# Patient Record
Sex: FEMALE | Race: WHITE | NOT HISPANIC OR LATINO | Employment: OTHER | URBAN - METROPOLITAN AREA
[De-identification: names, ages, dates, MRNs, and addresses within clinical notes are randomized per-mention and may not be internally consistent; named-entity substitution may affect disease eponyms.]

---

## 2017-05-31 ENCOUNTER — TRANSCRIBE ORDERS (OUTPATIENT)
Dept: ADMINISTRATIVE | Facility: HOSPITAL | Age: 62
End: 2017-05-31

## 2017-05-31 DIAGNOSIS — Z12.31 VISIT FOR SCREENING MAMMOGRAM: Primary | ICD-10-CM

## 2017-06-09 ENCOUNTER — HOSPITAL ENCOUNTER (OUTPATIENT)
Dept: MAMMOGRAPHY | Facility: HOSPITAL | Age: 62
Discharge: HOME | End: 2017-06-09
Admitting: FAMILY MEDICINE

## 2017-06-09 DIAGNOSIS — Z12.31 VISIT FOR SCREENING MAMMOGRAM: ICD-10-CM

## 2017-06-09 PROCEDURE — 77063 BREAST TOMOSYNTHESIS BI: CPT

## 2017-06-09 PROCEDURE — 77063 BREAST TOMOSYNTHESIS BI: CPT | Performed by: RADIOLOGY

## 2017-06-09 PROCEDURE — 77067 SCR MAMMO BI INCL CAD: CPT | Performed by: RADIOLOGY

## 2017-06-09 PROCEDURE — G0202 SCR MAMMO BI INCL CAD: HCPCS

## 2017-06-14 ENCOUNTER — TRANSCRIBE ORDERS (OUTPATIENT)
Dept: ENDOCRINOLOGY | Facility: CLINIC | Age: 62
End: 2017-06-14

## 2017-06-14 DIAGNOSIS — E04.1 THYROID NODULE: Primary | ICD-10-CM

## 2018-01-08 ENCOUNTER — TELEPHONE (OUTPATIENT)
Dept: INTERNAL MEDICINE | Facility: CLINIC | Age: 63
End: 2018-01-08

## 2018-01-08 NOTE — TELEPHONE ENCOUNTER
PT HAD TO CANCEL TODAY DUE TO  ICE ON HER ROADS  SHE NEEDS TO RESCHEDULE  BUT SHE DIDN'T WANT TO WAIT ANOTHER 6 MTHS TO SEE YOU.    PLEASE ADVISE WHERE WE CAN WORK IN  [phone]

## 2018-02-21 ENCOUNTER — OFFICE VISIT (OUTPATIENT)
Dept: ENDOCRINOLOGY | Facility: CLINIC | Age: 63
End: 2018-02-21

## 2018-02-21 VITALS
HEART RATE: 72 BPM | WEIGHT: 182 LBS | HEIGHT: 60 IN | DIASTOLIC BLOOD PRESSURE: 70 MMHG | OXYGEN SATURATION: 99 % | SYSTOLIC BLOOD PRESSURE: 124 MMHG | BODY MASS INDEX: 35.73 KG/M2

## 2018-02-21 DIAGNOSIS — E04.1 THYROID NODULE: Primary | ICD-10-CM

## 2018-02-21 PROCEDURE — 76536 US EXAM OF HEAD AND NECK: CPT | Performed by: INTERNAL MEDICINE

## 2018-02-21 PROCEDURE — 99244 OFF/OP CNSLTJ NEW/EST MOD 40: CPT | Performed by: INTERNAL MEDICINE

## 2018-02-21 RX ORDER — LATANOPROST 50 UG/ML
1 SOLUTION/ DROPS OPHTHALMIC NIGHTLY
COMMUNITY

## 2018-02-21 RX ORDER — FLUOXETINE HYDROCHLORIDE 20 MG/1
20 CAPSULE ORAL DAILY
COMMUNITY

## 2018-02-27 ENCOUNTER — TELEPHONE (OUTPATIENT)
Dept: INTERNAL MEDICINE | Facility: CLINIC | Age: 63
End: 2018-02-27

## 2018-06-28 ENCOUNTER — TRANSCRIBE ORDERS (OUTPATIENT)
Dept: ADMINISTRATIVE | Facility: HOSPITAL | Age: 63
End: 2018-06-28

## 2018-06-28 DIAGNOSIS — Z12.31 VISIT FOR SCREENING MAMMOGRAM: Primary | ICD-10-CM

## 2018-07-13 ENCOUNTER — HOSPITAL ENCOUNTER (OUTPATIENT)
Dept: MAMMOGRAPHY | Facility: HOSPITAL | Age: 63
Discharge: HOME | End: 2018-07-13
Admitting: FAMILY MEDICINE

## 2018-07-13 DIAGNOSIS — Z12.31 VISIT FOR SCREENING MAMMOGRAM: ICD-10-CM

## 2018-07-13 PROCEDURE — 77067 SCR MAMMO BI INCL CAD: CPT | Performed by: RADIOLOGY

## 2018-07-13 PROCEDURE — 77063 BREAST TOMOSYNTHESIS BI: CPT | Performed by: RADIOLOGY

## 2018-07-13 PROCEDURE — 77063 BREAST TOMOSYNTHESIS BI: CPT

## 2018-07-13 PROCEDURE — 77067 SCR MAMMO BI INCL CAD: CPT

## 2019-02-04 ENCOUNTER — OFFICE VISIT (OUTPATIENT)
Dept: ENDOCRINOLOGY | Facility: CLINIC | Age: 64
End: 2019-02-04

## 2019-02-04 VITALS
HEIGHT: 60 IN | OXYGEN SATURATION: 98 % | WEIGHT: 194 LBS | BODY MASS INDEX: 38.09 KG/M2 | DIASTOLIC BLOOD PRESSURE: 84 MMHG | HEART RATE: 69 BPM | SYSTOLIC BLOOD PRESSURE: 122 MMHG

## 2019-02-04 DIAGNOSIS — E04.2 MULTIPLE THYROID NODULES: Primary | ICD-10-CM

## 2019-02-04 PROBLEM — H40.9 GLAUCOMA: Status: ACTIVE | Noted: 2017-09-28

## 2019-02-04 PROBLEM — F32.A DEPRESSION: Status: ACTIVE | Noted: 2017-09-28

## 2019-02-04 PROCEDURE — 76536 US EXAM OF HEAD AND NECK: CPT | Performed by: INTERNAL MEDICINE

## 2019-02-04 PROCEDURE — 99213 OFFICE O/P EST LOW 20 MIN: CPT | Performed by: INTERNAL MEDICINE

## 2019-02-04 NOTE — PROGRESS NOTES
"Chief Complaint   Patient presents with   • Thyroid nodule     f/u        HPI:   Whit Beckham is a 63 y.o.female returns to Endocrine Clinic for f/u evaluation of her thyroid nodules. Last visit 02/21/2018. Her history is as follows:    Interim Events:   - pt returns today because her PCP, Ms. Dionne Balderas PA-C, told the patient she needed a f/u Thyroid US  - Pt was seen by me on 02/21/2018. I instructed the pt to return as needed if changes in the gland were noted on physical exam.        1) thyroid nodules:  - pt reports that in 1999 a former provider thought the right side of her neck was larger than the left. An MRI was completed and showed that she had a \" larger muscle on the right side of her neck than the left.\"  - Pt was later thought to have a goiter on physical exam and a mass on the right side of her neck. This was further evaluated with imaging and labs:  (06/02/2017) TSH 2.360 (0.450 - 4.500), free T4 1.37 (0.82 - 1.77)  (06/09/2017, Neck US, [facility]) imp: \"no sonographic abnormality in the area of clinical concern.\"  (06/09/2017, Thyroid US [facility]) imp: 5 mm nodule in the left lobe of the thyroid. 9 mm nodule in the right lobe of the thyroid.     Initial Endocrine Clinic Visit: (02/21/2018)  Thyroid US completed in clinic showed the thyroid gland was normal in size by measured dimensions. In the right superior lobe, a subcentimeter (0.55 cm) mixed cystic and solid nodule was identified. The small, predominantly cystic nodule had a smooth margin, no vascularity, and no microcalcifications. In the left mid lobe, a 0.79(L) x 0.46(AP) x 0.54(T) cm isoechoic, mixed solid and cystic nodule was identified. The small nodule had a smooth margin, no vascularity, and no microcalcifications. The small nodules identified did not meet criteria for FNA and lacked suspicious US characteristics.  RECOMMENDATIONS: Repeat Thyroid US recommended if changes in the gland are noted on physical exam.     [facility]: paternal " "aunt had thyroid cancer  PMH: no h/o irradiation of head, neck, or chest    On further review, she denies a pattern of symptoms consistent with hyper- or hypothyroidism and denies a pattern of symptoms consistent with obstructive goiter.    Pt reports her thyroid labs were normal in 07/2018    Review of Systems   Constitutional: Negative.  Negative for fever.        Weight gain   HENT: Positive for hearing loss and trouble swallowing (dry foods).    Eyes: Negative.    Respiratory: Negative.    Cardiovascular: Negative.    Gastrointestinal: Negative.  Negative for constipation.   Endocrine: Negative.  Negative for cold intolerance and heat intolerance.        Hot flashes occasional   Genitourinary: Negative.    Musculoskeletal: Negative.    Skin: Negative.    Allergic/Immunologic: Negative.    Neurological: Negative.    Hematological: Negative.    Psychiatric/Behavioral: Negative.         H/o anxiety and depression     The following portions of the patient's history were reviewed and updated as appropriate: allergies, current medications, past family history, past medical history, past social history, past surgical history and problem list.    /84   Pulse 69   Ht 152.4 cm (60\")   Wt 88 kg (194 lb)   SpO2 98%   BMI 37.89 kg/m²   Physical Exam   Constitutional: She is oriented to person, place, and time. She appears well-developed. No distress.   HENT:   Head: Normocephalic.   Mouth/Throat: Oropharynx is clear and moist.   Eyes: Conjunctivae and EOM are normal. Pupils are equal, round, and reactive to light.   Neck: No tracheal deviation present. No thyromegaly present.   No palpable thyroid nodules     Cardiovascular: Normal rate, regular rhythm and normal heart sounds.   No murmur heard.  Pulmonary/Chest: Effort normal and breath sounds normal. No respiratory distress.   Lymphadenopathy:     She has no cervical adenopathy.   Neurological: She is alert and oriented to person, place, and time. No cranial " nerve deficit.   Skin: Skin is warm and dry. She is not diaphoretic. No erythema.   Psychiatric: She has a normal mood and affect. Her behavior is normal.   Vitals reviewed.    LABS/IMAGING: outside records / prior records reviewed and summarized in HPI    PROCEDURES (in office):  Thyroid Ultrasound Report  [facility]  [place], [place]    Date: 02/04/2019  Indication: thyroid nodule  Comparison Imaging: [facility]ocrine Clinic Thyroid US 02/2018  Clinical History: 63 y.o. Female with h/o normal thyroid function and small thyroid nodules on prior imaging.    Real time high resolution imaging of the thyroid gland was performed in transverse and longitudinal planes.  The right lobe measured 4.41 cm in Length x 1.41 cm in AP diameter x 1.58 cm in TV dimension.  The isthmus measured 0.18 cm in thickness.  The left thyroid lobe measured 4.06 cm in length x 1.23 cm in AP diameter x 1.37 cm in TV dimension.    The thyroid gland appeared overall isoechoic with heterogeneous echotexture.    In the right superior to mid lobe, two adjacent subcentimeter (0.53 cm, 0.57 cm ) mixed cystic and solid nodules were again identified. The small cystic nodules had smooth margin,s no vascularity, and no microcalcifications.     In the left mid lobe, the mixed solid and cystic nodule seen on prior imaging was not present on today's exam.   No pathologic lymph nodes were seen.     IMPRESSION:   1) The thyroid gland was normal in size by measured dimensions.   2) In the right superior to mid lobe, two adjacent subcentimeter (0.53 cm, 0.57 cm ) mixed cystic and solid nodules were again identified. The small cystic nodules had smooth margin,s no vascularity, and no microcalcifications.   3) In the left mid lobe,  the mixed solid and cystic nodule seen on prior imaging was not present on today's exam.   4) The small nodules identified in the right lobe did not meet criteria for FNA and lacked suspicious US  characteristics.    RECOMMENDATIONS: Repeat Thyroid US recommended if changes in the gland/neck are noted on physical exam.       ASSESSMENT/PLAN:  1) thyroid nodules: Thyroid US completed in clinic today showed-  - The thyroid gland was normal in size by measured dimensions.   - In the right superior to mid lobe, two adjacent subcentimeter (0.53 cm, 0.57 cm ) mixed cystic and solid nodules were again identified. The small cystic nodules had smooth margin,s no vascularity, and no microcalcifications.   - In the left mid lobe,  the mixed solid and cystic nodule seen on prior imaging was not present on today's exam.   - The small nodules identified in the right lobe did not meet criteria for FNA and lacked suspicious US characteristics.    RECOMMENDATIONS: Repeat Thyroid US recommended only if changes in the gland/neck are noted on physical exam.     Reassured patient that her thyroid gland is normal in size. The small nodules identified on US lacked suspicious US characteristics. Repeat Thyroid US not needed unless there is a change in the the gland/neck are noted on physical exam.     RTC as needed

## 2019-02-04 NOTE — PROGRESS NOTES
Thyroid Ultrasound Report  [facility]  [place], [place]    Date: 02/04/2019  Indication: thyroid nodule  Comparison Imaging: Endocrine Clinic Thyroid US 02/2018  Clinical History: 63 y.o. Female with h/o normal thyroid function and small thyroid nodules on prior imaging.    Real time high resolution imaging of the thyroid gland was performed in transverse and longitudinal planes.  The right lobe measured 4.41 cm in Length x 1.41 cm in AP diameter x 1.58 cm in TV dimension.  The isthmus measured 0.18 cm in thickness.  The left thyroid lobe measured 4.06 cm in length x 1.23 cm in AP diameter x 1.37 cm in TV dimension.    The thyroid gland appeared overall isoechoic with heterogeneous echotexture.    In the right superior to mid lobe, two adjacent subcentimeter (0.53 cm, 0.57 cm ) mixed cystic and solid nodules were again identified. The small cystic nodules had smooth margin,s no vascularity, and no microcalcifications.     In the left mid lobe, the mixed solid and cystic nodule seen on prior imaging was not present on today's exam.   No pathologic lymph nodes were seen.     IMPRESSION:   1) The thyroid gland was normal in size by measured dimensions.   2) In the right superior to mid lobe, two adjacent subcentimeter (0.53 cm, 0.57 cm ) mixed cystic and solid nodules were again identified. The small cystic nodules had smooth margin,s no vascularity, and no microcalcifications.   3) In the left mid lobe,  the mixed solid and cystic nodule seen on prior imaging was not present on today's exam.   4) The small nodules identified in the right lobe did not meet criteria for FNA and lacked suspicious US characteristics.    RECOMMENDATIONS: Repeat Thyroid US recommended if changes in the gland/neck are noted on physical exam.

## 2019-07-15 ENCOUNTER — TRANSCRIBE ORDERS (OUTPATIENT)
Dept: ADMINISTRATIVE | Facility: HOSPITAL | Age: 64
End: 2019-07-15

## 2019-07-15 DIAGNOSIS — Z12.31 VISIT FOR SCREENING MAMMOGRAM: Primary | ICD-10-CM

## 2019-09-11 ENCOUNTER — HOSPITAL ENCOUNTER (OUTPATIENT)
Dept: MAMMOGRAPHY | Facility: HOSPITAL | Age: 64
Discharge: HOME | End: 2019-09-11
Admitting: FAMILY MEDICINE

## 2019-09-11 DIAGNOSIS — Z12.31 VISIT FOR SCREENING MAMMOGRAM: ICD-10-CM

## 2019-09-11 PROCEDURE — 77067 SCR MAMMO BI INCL CAD: CPT

## 2019-09-11 PROCEDURE — 77063 BREAST TOMOSYNTHESIS BI: CPT | Performed by: RADIOLOGY

## 2019-09-11 PROCEDURE — 77067 SCR MAMMO BI INCL CAD: CPT | Performed by: RADIOLOGY

## 2019-09-11 PROCEDURE — 77063 BREAST TOMOSYNTHESIS BI: CPT

## 2020-09-01 ENCOUNTER — TRANSCRIBE ORDERS (OUTPATIENT)
Dept: ADMINISTRATIVE | Facility: HOSPITAL | Age: 65
End: 2020-09-01

## 2020-09-01 DIAGNOSIS — Z12.31 VISIT FOR SCREENING MAMMOGRAM: Primary | ICD-10-CM

## 2020-09-16 ENCOUNTER — HOSPITAL ENCOUNTER (OUTPATIENT)
Dept: MAMMOGRAPHY | Facility: HOSPITAL | Age: 65
Discharge: HOME | End: 2020-09-16
Admitting: PHYSICIAN ASSISTANT

## 2020-09-16 DIAGNOSIS — Z12.31 VISIT FOR SCREENING MAMMOGRAM: ICD-10-CM

## 2020-09-16 PROCEDURE — 77067 SCR MAMMO BI INCL CAD: CPT

## 2020-09-16 PROCEDURE — 77063 BREAST TOMOSYNTHESIS BI: CPT | Performed by: RADIOLOGY

## 2020-09-16 PROCEDURE — 77067 SCR MAMMO BI INCL CAD: CPT | Performed by: RADIOLOGY

## 2020-09-16 PROCEDURE — 77063 BREAST TOMOSYNTHESIS BI: CPT

## 2021-09-23 ENCOUNTER — TRANSCRIBE ORDERS (OUTPATIENT)
Dept: ADMINISTRATIVE | Facility: HOSPITAL | Age: 66
End: 2021-09-23

## 2021-09-23 DIAGNOSIS — Z12.31 VISIT FOR SCREENING MAMMOGRAM: Primary | ICD-10-CM

## 2021-10-14 ENCOUNTER — HOSPITAL ENCOUNTER (OUTPATIENT)
Dept: MAMMOGRAPHY | Facility: HOSPITAL | Age: 66
Discharge: HOME | End: 2021-10-14
Admitting: PHYSICIAN ASSISTANT

## 2021-10-14 DIAGNOSIS — Z12.31 VISIT FOR SCREENING MAMMOGRAM: ICD-10-CM

## 2021-10-14 PROCEDURE — 77067 SCR MAMMO BI INCL CAD: CPT

## 2021-10-14 PROCEDURE — 77063 BREAST TOMOSYNTHESIS BI: CPT | Performed by: RADIOLOGY

## 2021-10-14 PROCEDURE — 77063 BREAST TOMOSYNTHESIS BI: CPT

## 2021-10-14 PROCEDURE — 77067 SCR MAMMO BI INCL CAD: CPT | Performed by: RADIOLOGY

## 2021-11-23 ENCOUNTER — HOSPITAL ENCOUNTER (OUTPATIENT)
Dept: MAMMOGRAPHY | Facility: HOSPITAL | Age: 66
Discharge: HOME | End: 2021-11-23
Admitting: RADIOLOGY

## 2021-11-23 DIAGNOSIS — R92.8 ABNORMAL MAMMOGRAM: ICD-10-CM

## 2021-11-23 PROCEDURE — 77061 BREAST TOMOSYNTHESIS UNI: CPT | Performed by: RADIOLOGY

## 2021-11-23 PROCEDURE — G0279 TOMOSYNTHESIS, MAMMO: HCPCS

## 2021-11-23 PROCEDURE — 77065 DX MAMMO INCL CAD UNI: CPT | Performed by: RADIOLOGY

## 2021-11-23 PROCEDURE — 77065 DX MAMMO INCL CAD UNI: CPT

## 2023-03-31 ENCOUNTER — TRANSCRIBE ORDERS (OUTPATIENT)
Dept: ADMINISTRATIVE | Facility: HOSPITAL | Age: 68
End: 2023-03-31
Payer: MEDICARE

## 2023-03-31 DIAGNOSIS — Z12.31 SCREENING MAMMOGRAM FOR BREAST CANCER: Primary | ICD-10-CM

## 2023-04-25 ENCOUNTER — HOSPITAL ENCOUNTER (OUTPATIENT)
Dept: MAMMOGRAPHY | Facility: HOSPITAL | Age: 68
Discharge: HOME | End: 2023-04-25
Admitting: PHYSICIAN ASSISTANT
Payer: MEDICARE

## 2023-04-25 DIAGNOSIS — Z12.31 SCREENING MAMMOGRAM FOR BREAST CANCER: ICD-10-CM

## 2023-04-25 PROCEDURE — 77063 BREAST TOMOSYNTHESIS BI: CPT

## 2023-04-25 PROCEDURE — 77067 SCR MAMMO BI INCL CAD: CPT

## 2024-06-03 ENCOUNTER — TRANSCRIBE ORDERS (OUTPATIENT)
Dept: ADMINISTRATIVE | Facility: HOSPITAL | Age: 69
End: 2024-06-03
Payer: MEDICARE

## 2024-06-03 DIAGNOSIS — Z12.31 VISIT FOR SCREENING MAMMOGRAM: Primary | ICD-10-CM

## 2024-10-01 LAB
NCCN CRITERIA FLAG: ABNORMAL
TYRER CUZICK SCORE: 4.7

## 2024-10-04 LAB
NCCN CRITERIA FLAG: NORMAL
TYRER CUZICK SCORE: 4.7

## 2024-10-04 NOTE — PROGRESS NOTES
This patient recently took the CARE risk assessment as part of their mammogram appointment. Based on the patient's responses, NCCN criteria for genetic testing was met.     Navigator follow-up:   Patient indicated a previous desmoid tumor in the assessment.  Upon discussion with the patient, that was inaccurate.  The assessment was updated to remove.  The patient does not meet NCCN criteria for genetic testing and a new assessment will post to Epic.

## 2024-10-16 ENCOUNTER — HOSPITAL ENCOUNTER (OUTPATIENT)
Dept: MAMMOGRAPHY | Facility: HOSPITAL | Age: 69
Discharge: HOME | End: 2024-10-16
Admitting: PHYSICIAN ASSISTANT
Payer: MEDICARE

## 2024-10-16 DIAGNOSIS — Z12.31 VISIT FOR SCREENING MAMMOGRAM: ICD-10-CM

## 2024-10-16 PROCEDURE — 77063 BREAST TOMOSYNTHESIS BI: CPT

## 2024-10-16 PROCEDURE — 77067 SCR MAMMO BI INCL CAD: CPT
